# Patient Record
Sex: MALE | Race: WHITE
[De-identification: names, ages, dates, MRNs, and addresses within clinical notes are randomized per-mention and may not be internally consistent; named-entity substitution may affect disease eponyms.]

---

## 2018-01-10 ENCOUNTER — HOSPITAL ENCOUNTER (INPATIENT)
Dept: HOSPITAL 68 - SDA | Age: 83
LOS: 2 days | Discharge: HOME HEALTH | DRG: 470 | End: 2018-01-12
Attending: ORTHOPAEDIC SURGERY | Admitting: ORTHOPAEDIC SURGERY
Payer: COMMERCIAL

## 2018-01-10 VITALS — DIASTOLIC BLOOD PRESSURE: 58 MMHG | SYSTOLIC BLOOD PRESSURE: 128 MMHG

## 2018-01-10 VITALS — WEIGHT: 178 LBS | BODY MASS INDEX: 27.94 KG/M2 | HEIGHT: 67 IN

## 2018-01-10 VITALS — DIASTOLIC BLOOD PRESSURE: 70 MMHG | SYSTOLIC BLOOD PRESSURE: 140 MMHG

## 2018-01-10 DIAGNOSIS — M16.12: Primary | ICD-10-CM

## 2018-01-10 DIAGNOSIS — I10: ICD-10-CM

## 2018-01-10 PROCEDURE — 0SRD0JZ REPLACEMENT OF LEFT KNEE JOINT WITH SYNTHETIC SUBSTITUTE, OPEN APPROACH: ICD-10-PCS | Performed by: ORTHOPAEDIC SURGERY

## 2018-01-10 NOTE — OPERATIVE REPORT
Operative/Inv Procedure Report
Surgery Date: 01/10/18
Name of Procedure:
Left total hip replacement
Pre-Operative Diagnosis:
Primary left hip DJD
Post-Operative Diagnosis:
Same
Estimated Blood Loss: 250
Surgeon/Assistant:
Medhat Hunt MD
Anesthesia: block
 
Operative/Procedure Note
Note:
Description of Procedure:
 
The patient was taken to the operating room and positively identified.  After 
induction of spinal anesthesia and administration of appropriate pre-operative 
antibiotics, the patient was positioned supine on the operating room table and 
all bony prominences were well padded.  After performing a surgical timeout, the
left lower extremity was prepped and draped in the usual sterile fashion.
 
A direct anterior approach was made to the left hip.  The incision was carried 
sharply through superficial soft tissues to the level of the fascia.  Meticulous
hemostasis was maintained with Bovie electocautery.  The fascia over the tensor 
fascia john muscle was opened sharply and the interval between the TFL and the 
sartorius was entered bluntly taking care to stay lateral to the lateral femoral
cutaneous nerve.  Retractors were placed around the femoral neck and the 
pericapsular fat was identified.  The ascending branches of the lateral femoral 
circumflex vessels were identified and carefully coagulated.  The pericapsular 
fat and anterior capsule were then resected.  A napkin ring osteotomy was 
performed and the femoral head was removed without difficulty.
 
Attention was then turned to the acetabulum.  After appropriate placement of 
retractors, the acetabulum was exposed.  Soft tissue was cleaned from the 
acetabular margin and notch.  Overhanging osteophytes were removed and the 
teardrop was exposed.  The acetabulum was then sequentially reamed to accept a 
56 mm [place] Tritanium hemispherical solid shell.  This was impacted into place
in the appropriate position and fitted with a 36 mm Trident X3 zero degree 
polyethylene insert.
 
Attention was then turned to the femur.  After performing the appropriate 
ligament releases, the proximal femur was exposed.  It was then sequentially 
broached to accept a size 7 Ivanna secure fit advanced 127 neck angle stem.  
This was trialed for leg length and stability.  The trial component was removed 
and the final component was impacted into place.  The trunnion was carefully 
cleaned and fit with a 36 mm, +0 Biolox delta ceramic femoral head.  The hip was
reduced and put through a full range of motion and found to be stable.
 
The articular space was then irrigated with sterile saline.  The periarticular 
soft tissues were infilitrated with Marcaine.  The fascial layer was closed with
interrupted #1 vicryl suture and the skin was re-approximated with interrupted 2
-0 vicryl.  The skin was closed with a running 3-0 V-Lock suture.  Steri-strips 
and a sterile dressing were applied.  The patient was awakened and taken to the 
recovery room in satisfactory condition.

## 2018-01-10 NOTE — ADMISSION CORE MEASURES
Acute Coronary Syndrome (CM)
 
ACS Core Measures
Acute Coronary Syndrome Diagnosis No
 
Congestive Heart Failure (NEW)
 
CHF Core Measures
Congestive Heart Failure Diagnosis No
 
Cerebrovascular Accident (NEW)
 
CVA Core Measures
CVA/TIA Diagnosis No
 
Venous Thromboembolism [id number]
 
VTE Core Fabian (View Protocol)
VTE Risk Factors Surgery
No Mechanical VTE Prophylaxis d/t N/A MechProphylax Ordered
No VTE Pharm Prophylaxis d/t NA PharmProphylax ordered
 
Problem List
 
As ranked by this Provider
includes Assessment & Plan
 1. Unilateral primary osteoarthritis, left hip
 
 
HOME MEDS
Home Med List
Aspirin ([place] Aspirin) 81 MG TABLET.DR   1 TAB PO DAILY HEARTHEALTH  (
Reported)
Hydrochlorothiazide 12.5 MG TABLET   1 TAB PO DAILY HTN  (Reported)
[MELATONIN] 5 MG   1 TAB PO QHS PRN SLEEP  (Reported)
Propranolol HCl (Propranolol HCl ER) 120 MG CAP.SA.24H   1 CAP PO DAILY TREMOR  
(Reported)
Rosuvastatin Calcium (Crestor) 20 MG TABLET   1 TAB PO DAILY CHOLESTEROL  (
Reported)

## 2018-01-10 NOTE — SURG SHORT-STAY <48HRS DIS SUM
Visit Information
 
Visit Dates
Admission Date:
01/10/18
 
Discharge Date:
1/12/18
 
 
Surgical Short Stay DC Summary
Admission Diagnosis:
Primary osteoarthritis left hip
Final Diagnosis:
Same, status post left total hip arthroplasty
Procedure(s):
Left total hip arthroplasty
Summary/Significant Findings:
Patient was admitted to the hospital for an elective total joint replacement.  
Procedure was tolerated well and patient was transferred to a general surgical 
floor.  Diet was advanced and tolerated.  Physical therapy performed evaluation 
and treatment.  At time of hospital discharge, vital signs were stable, 
neurovascular status was intact, and pain was controlled with the use of oral 
pain medications.
 
Condition at Discharge:
Stable
Discharge Disposition: home health services
Discharge instructions provided to patient/family: Yes
Post discharge follow-up plan:
Follow up with Dr. Hunt in 6 weeks from date of surgery.  Please call his 
office to schedule/confirm this appointment.

## 2018-01-10 NOTE — PATIENT DISCHARGE INSTRUCTIONS
Discharge Instructions
 
General Discharge Information
You were seen/treated for:
Primary Left hip osteoarthritis
You had these procedures:
Left total hip arthroplasty
Watch for these problems:
Increasing pain despite the use of pain medication
Increasing redness, warmth or swelling
Drainage of any type from incision
Inability to bear weight on operative leg
Persistent nausea and vomiting
Fever greater than 101.5 degrees
 
Other wound care:
Please keep wound clean and dry.  
No ointments or lotions of any type on or near incision.
Your dressing will be changed by your nurse on the second day after your 
surgery.  Daily dry dressing changes are recommended each day thereafter.  
You may shower 48hr after surgery.  Do not soak your wound- no tub baths or 
swimming. 
 
Special Instructions:
Aspirin:  You are taking this medication to help prevent  blood clot formation. 
Please take with food to protect your stomach lining.   Take as directed.
 
Protonix (pantoprazole): Take this medication to protect your stomach lining 
while taking high dose aspirin.
 
Constipation:  Pain medication can cause constipation.  It is recommended that 
you take Colace and miralax daily.  You may discontinue this medication if you 
develop loose stool or diarrhea.  If you wish to continue this medication, it is
available over the counter.  If you are unable to move your bowels or pass gas 
after several days,  please contact your doctor.
 
 
Diet
Continue normal diet: Yes
Recommended Diet: Regular
 
Activity
Activity Limited to: Weight bear as tolerated
Additional ACTIVITY Info:
Use assistive devices as needed
 
Acute Coronary Syndrome
 
Inclusion Criteria
At DC or during hospital stay patient has or had the following:
ACS DIAGNOSIS No
 
Discharge Core Measures
Meds if any: Prescribed or Continued at Discharge
Meds if any: NOT Prescribed or Continued at Discharge
 
Congestive Heart Failure
 
Inclusion Criteria
At DC or during hospital stay patient has or had the following:
CHF DIAGNOSIS No
 
Discharge Core Measures
Meds if any: Prescribed or Continued at Discharge
Meds if any: NOT Prescribed or Continued at Discharge
 
Cerebrovascular accident
 
Inclusion Criteria
At DC or during hospital stay patient has or had the following:
CVA/TIA Diagnosis No
 
Discharge Core Measures
Meds if any: Prescribed or Continued at Discharge
Meds if any: NOT Prescribed or Continued at Discharge
 
Venous thromboembolism
 
Inclusion Criteria
VTE Diagnosis No
VTE Type NONE
VTE Confirmed by (Test) NONE
 
Discharge Core Measures
- Per Current guidelines, there needs to be overlap
- treatment for the first 5 days of Warfarin therapy.
- If discharged on Warfarin prior to 5 days of
- overlap therapy, the patient will need to be
- assessed for post discharge needs including
- *Post discharge parental anticoagulation
- *Warfarin and/or parental anticoagulation education
- *Follow up date to check INR post discharge
At least 5 days overlap therapy as Inpatient No
Meds if any: Prescribed or Continued at Discharge
Note: Overlap Therapy is Warfarin and Anticoagulant
Meds if any: NOT Prescribed or Continued at Discharge

## 2018-01-10 NOTE — RADIOLOGY REPORT
EXAMINATION:
XR HIP, LEFT
 
CLINICAL INFORMATION:
Total hip replacement
 
COMPARISON:
None
 
TECHNIQUE:
Two views of the left hip.
 
FINDINGS:
Total left hip arthroplasty hardware appears well seated and in anatomic
alignment. No acute periprosthetic fracture is seen. Adjacent soft tissue gas
is noted.
 
IMPRESSION:
Status post left total hip arthroplasty with expected postoperative changes.

## 2018-01-10 NOTE — PN- ORTHOPEDIC
Subjective
Subjective:
POC S/P LEFT BRITTON
 
SITTING UP IN CHAIR COMFORTABLY
CRISTINO CP, SOB, NO N+V WITH DIET
HAS AMBUALTED WITH PT BUT UNSTEADY
 
Objective
Vital Signs and I&Os
Vital Signs
 
 
Date Time Temp Pulse Resp B/P B/P Pulse O2 O2 Flow FiO2
 
     Mean Ox Delivery Rate 
 
01/10 1502      94 Nasal 2.0L 
 
       Cannula  
 
 
 Intake & Output
 
 
 01/10 1600 01/10 0800 01/10 0000 01/09 1600 01/09 0800 01/09 0000
 
Intake Total      
 
Output Total      
 
Balance      
 
       
 
Patient 178 lb   178 lb  
 
Weight      
 
Weight Standing Scale     
 
Measurement      
 
Method      
 
 
 
Physical Exam:
CV: RRR
LUNGS: CLEAR
ABD: SOFT, +BS
EXT: DRSG DRY
       THIGH SOFT
       DISTAL CMS INTACT
 
Assessment/Plan
Assessment/Plan
ORTO STABLE
 
PLAN
ASA FOR DVT PROPHYLAXIS
OOB WITH PT/AMBULATE WBAT LEFT LE
TITRATE PAIN MEDS
ADVANCE DIET
HOME D/C PLANNING
 
 
Core Measures
 
Venous Thromboembolism
VTE Risk Factors Surgery
No Mechanical VTE Prophylaxis d/t N/A MechProphylax Ordered
No VTE Pharm Prophylaxis d/t NA PharmProphylax ordered

## 2018-01-11 VITALS — DIASTOLIC BLOOD PRESSURE: 60 MMHG | SYSTOLIC BLOOD PRESSURE: 120 MMHG

## 2018-01-11 VITALS — DIASTOLIC BLOOD PRESSURE: 68 MMHG | SYSTOLIC BLOOD PRESSURE: 124 MMHG

## 2018-01-11 VITALS — SYSTOLIC BLOOD PRESSURE: 106 MMHG | DIASTOLIC BLOOD PRESSURE: 58 MMHG

## 2018-01-11 VITALS — DIASTOLIC BLOOD PRESSURE: 72 MMHG | SYSTOLIC BLOOD PRESSURE: 122 MMHG

## 2018-01-11 VITALS — SYSTOLIC BLOOD PRESSURE: 124 MMHG | DIASTOLIC BLOOD PRESSURE: 62 MMHG

## 2018-01-11 LAB
ABSOLUTE GRANULOCYTE CT: 8.1 /CUMM (ref 1.4–6.5)
BASOPHILS # BLD: 0 /CUMM (ref 0–0.2)
BASOPHILS NFR BLD: 0 % (ref 0–2)
EOSINOPHIL # BLD: 0 /CUMM (ref 0–0.7)
EOSINOPHIL NFR BLD: 0.2 % (ref 0–5)
ERYTHROCYTE [DISTWIDTH] IN BLOOD BY AUTOMATED COUNT: 14 % (ref 11.5–14.5)
GRANULOCYTES NFR BLD: 81.8 % (ref 42.2–75.2)
HCT VFR BLD CALC: 36.8 % (ref 42–52)
LYMPHOCYTES # BLD: 0.9 /CUMM (ref 1.2–3.4)
MCH RBC QN AUTO: 30.4 PG (ref 27–31)
MCHC RBC AUTO-ENTMCNC: 33.4 G/DL (ref 33–37)
MCV RBC AUTO: 90.8 FL (ref 80–94)
MONOCYTES # BLD: 0.9 /CUMM (ref 0.1–0.6)
PLATELET # BLD: 193 /CUMM (ref 130–400)
PMV BLD AUTO: 7.5 FL (ref 7.4–10.4)
RED BLOOD CELL CT: 4.05 /CUMM (ref 4.7–6.1)
WBC # BLD AUTO: 10 /CUMM (ref 4.8–10.8)

## 2018-01-11 NOTE — PN- ORTHOPEDIC
Subjective
Subjective:
Awake, alert
Complained of pain overnight - well controlled at this time
Ambulated many times per nursing
 
 
Objective
Vital Signs and I&Os
Vital Signs
 
 
Date Time Temp Pulse Resp B/P B/P Pulse O2 O2 Flow FiO2
 
     Mean Ox Delivery Rate 
 
01/11 0600 97.8 69 18 124/68  95 Room Air  
 
01/11 0200 97.8 71 20 124/62  95 Room Air  
 
01/11 0000      94 Room Air  
 
01/10 2200 97.5 67 20 128/58  94 Room Air  
 
01/10 1907 97.9 64 20 140/70  96 Room Air  
 
01/10 1617  58  140/78     
 
01/10 1616 97.5        
 
01/10 1502      94 Nasal 2.0L 
 
       Cannula  
 
 
 Intake & Output
 
 
 01/11 0800 01/11 0000 01/10 1600 01/10 0800 01/10 0000 01/09 1600
 
Intake Total  1390    
 
Output Total 825 600    
 
Balance -825 790    
 
       
 
Intake, IV  550    
 
Intake, Oral  840    
 
Output, Urine 825 600    
 
Patient   178 lb   178 lb
 
Weight      
 
Weight   Standing Scale   
 
Measurement      
 
Method      
 
 
 
Physical Exam:
afebrile, vss
voiding without difficulty
 
General: alert and oriented times three
Chest: clear anteriorly bilaterally, RRR
Abd: soft, good bs
Ext: warm, trace edema BLE, normosensate, ood 5/5 INNA BLE, no calf tenderness
Wd: dressed, dry, ice pack in place
Current Medications:
 Current Medications
 
 
  Sig/Jamarcus Start time  Last
 
Medication Dose Route Stop Time Status Admin
 
Acetaminophen 650 MG Q4P PRN 01/10 1430 AC 
 
  PO   
 
Acetaminophen 0 .STK-MED ONE 01/10 0938 DC 
 
  PO   
 
Acetaminophen 975 MG ONCE 01/10 0000 DC 
 
  PO 01/10 2359  
 
Aspirin 325 MG BID 01/10 2200 AC 01/10
 
  PO   2116
 
Atorvastatin Calcium 80 MG 1700 01/10 1700 DC 
 
  PO   
 
Atorvastatin Calcium 80 MG 1700 01/10 1700 AC 01/10
 
  PO   1610
 
Budesonide/ 2 PUF BID 01/10 2200 AC 01/10
 
Formoterol Fumarate  INH   2117
 
Budesonide/ 2 PUF BID 01/10 1000 DC 
 
Formoterol Fumarate  INH   
 
Cefazolin Sodium 2 GM IQ8 01/10 1600 DC 01/11
 
N/A 1 UNIT IV 01/11 0029  0009
 
Cefazolin Sodium 2,000 MG ONCE 01/10 0000 DC 
 
  IV 01/10 2359  
 
Dextrose/Lactated  1,000 ML Q13H 01/10 1430 AC 01/11
 
Ringer's  IV   0145
 
Docusate Sodium 100 MG BID 01/10 2200 AC 01/10
 
  PO   2116
 
Fentanyl Citrate 100 MCG .STK-MED ONE 01/10 0929 DC 
 
  IM 01/10 0930  
 
Hydrochlorothiazide 12.5 MG DAILY 01/11 1000 AC 
 
  PO   
 
Hydrochlorothiazide 12.5 MG DAILY 01/10 1000 DC 
 
  PO   
 
Hydromorphone HCl 2 MG Q4P PRN 01/10 1430 AC 
 
  PO   
 
Hydromorphone HCl 4 MG Q4P PRN 01/10 1430 AC 01/11
 
  PO   0557
 
Hydromorphone HCl 2 MG .STK-MED ONE 01/10 1328 DC 
 
  IM 01/10 1329  
 
Hydromorphone HCl 2 MG .STK-MED ONE 01/10 1256 DC 
 
  IM 01/10 1257  
 
Hydromorphone HCl 2 MG .STK-MED ONE 01/10 1242 DC 
 
  IM 01/10 1243  
 
Ketorolac  15 MG Q8P PRN 01/10 1430 AC 
 
Tromethamine  IV 01/13 1424  
 
Melatonin 5 MG AT BEDTIME PRN 01/10 1430 AC 01/11
 
  PO   0100
 
Melatonin 5 MG AT BEDTIME PRN 01/10 1000 DC 
 
  PO   
 
Midazolam HCl 4 MG .STK-MED ONE 01/10 0928 DC 
 
  IM 01/10 0929  
 
Morphine Sulfate 2 MG Q2P PRN 01/10 1430 AC 01/11
 
  IV   0430
 
Omeprazole 20 MG DAILY AC 01/11 0700 AC 01/11
 
  PO   0554
 
Ondansetron HCl 4 MG Q6P PRN 01/10 1430 AC 
 
  IV   
 
Oxycodone HCl 0 .STK-MED ONE 01/10 0937 DC 
 
  PO   
 
Oxycodone HCl 10 MG ONCE 01/10 0000 DC 
 
  PO 01/10 2359  
 
Polyethylene Glycol 17 GM DAILY 01/11 1000 AC 
 
  PO   
 
Promethazine HCl 12.5 MG Q6P PRN 01/10 1430 AC 
 
  IV 01/17 1014  
 
Propranolol HCl 120 MG DAILY 01/10 1000 CAN 
 
  PO   
 
Propranolol HCl 120 MG DAILY 01/10 1000 AC 01/10
 
  PO   1617
 
Tranexamic Acid 2,000 MG .STK-MED ONE 01/10 0928 DC 
 
  IV 01/10 0929  
 
 
 
 
Assessment/Plan
Assessment/Plan
81yo male pod 1 s/p L BRITTON
 
asa 325mg po bid for dvt ppx
pain management
PT - WBAT
dc planning
 
 
Core Measures
 
Venous Thromboembolism
VTE Risk Factors Surgery
No Mechanical VTE Prophylaxis d/t N/A MechProphylax Ordered
No VTE Pharm Prophylaxis d/t NA PharmProphylax ordered

## 2018-01-12 VITALS — DIASTOLIC BLOOD PRESSURE: 60 MMHG | SYSTOLIC BLOOD PRESSURE: 114 MMHG

## 2018-01-12 VITALS — SYSTOLIC BLOOD PRESSURE: 114 MMHG | DIASTOLIC BLOOD PRESSURE: 60 MMHG

## 2018-01-12 NOTE — PN- ORTHOPEDIC
Subjective
Subjective:
No acute overnight events reported.  Has been having pain but is reluctant to 
take narcotic.  Has been oob.  Has ambulated with PT.  Denies chest pain, 
shortness of breath and difficulty breathing.  Denies nausea and vomitting.  Has
been voiding. Tolerating diet.
 
Objective
Vital Signs and I&Os
Vital Signs
 
 
Date Time Temp Pulse Resp B/P B/P Pulse O2 O2 Flow FiO2
 
     Mean Ox Delivery Rate 
 
01/12 0653 97.7 67 20 114/60  98 Room Air  
 
01/11 2242 99.2 63 20 106/58  95 Room Air  
 
01/11 1445 97.7 60 20 120/60  97   
 
 
 Intake & Output
 
 
 01/12 1600 01/12 0800 01/12 0000 01/11 1600 01/11 0800 01/11 0000
 
Intake Total  140 260 
 
Output Total  200 300 500 825 600
 
Balance  -60 -40 -260 -125 790
 
       
 
Intake, IV  20 20  600 550
 
Intake, Oral  120 240 240 100 840
 
Number     0 
 
Bowel      
 
Movements      
 
Output, Urine  200 300 500 825 600
 
 
 
Physical Exam:
General: Alert and oriented x3, no acute distress
Cardiac:  RRR, s1s2
Pulm: C T A bilaterally
ABD:  Non-tender, non-distended
Extremiteis:  Moves all extremities, distal sensation grossly intact.  Skin warm
and well perfused. DP pulses palpable bilaterally.  Bilateral calves soft and 
non-tender.  No peripheral edema noted.  Dressing dry and intact. Thigh 
compartment soft.
 
Assessment/Plan
Assessment/Plan
This is an 82 year old male, POD 2, s/p L THR, c/o pain at surgical site
 
-Dilaudid q4-6 hour prn for pain, encouraged
-OOB, wbat
-Diet as toelrated
- bid for dvt ppx
-Anticipate dc to home today
Will saud Hunt
 
 
Core Measures
 
Venous Thromboembolism
VTE Risk Factors Surgery
No Mechanical VTE Prophylaxis d/t N/A MechProphylax Ordered
No VTE Pharm Prophylaxis d/t NA PharmProphylax ordered